# Patient Record
Sex: FEMALE | Race: ASIAN | NOT HISPANIC OR LATINO
[De-identification: names, ages, dates, MRNs, and addresses within clinical notes are randomized per-mention and may not be internally consistent; named-entity substitution may affect disease eponyms.]

---

## 2020-07-13 ENCOUNTER — TRANSCRIPTION ENCOUNTER (OUTPATIENT)
Age: 30
End: 2020-07-13

## 2021-08-05 PROBLEM — Z00.00 ENCOUNTER FOR PREVENTIVE HEALTH EXAMINATION: Status: ACTIVE | Noted: 2021-08-05

## 2021-08-06 ENCOUNTER — NON-APPOINTMENT (OUTPATIENT)
Age: 31
End: 2021-08-06

## 2021-08-11 ENCOUNTER — APPOINTMENT (OUTPATIENT)
Dept: ENDOCRINOLOGY | Facility: CLINIC | Age: 31
End: 2021-08-11
Payer: COMMERCIAL

## 2021-08-11 ENCOUNTER — NON-APPOINTMENT (OUTPATIENT)
Age: 31
End: 2021-08-11

## 2021-08-11 VITALS
TEMPERATURE: 97.2 F | HEART RATE: 109 BPM | SYSTOLIC BLOOD PRESSURE: 114 MMHG | BODY MASS INDEX: 17.42 KG/M2 | WEIGHT: 111 LBS | HEIGHT: 67 IN | DIASTOLIC BLOOD PRESSURE: 74 MMHG

## 2021-08-11 DIAGNOSIS — Z82.49 FAMILY HISTORY OF ISCHEMIC HEART DISEASE AND OTHER DISEASES OF THE CIRCULATORY SYSTEM: ICD-10-CM

## 2021-08-11 DIAGNOSIS — Z78.9 OTHER SPECIFIED HEALTH STATUS: ICD-10-CM

## 2021-08-11 DIAGNOSIS — I49.9 CARDIAC ARRHYTHMIA, UNSPECIFIED: ICD-10-CM

## 2021-08-11 DIAGNOSIS — R63.6 UNDERWEIGHT: ICD-10-CM

## 2021-08-11 DIAGNOSIS — K21.9 GASTRO-ESOPHAGEAL REFLUX DISEASE W/OUT ESOPHAGITIS: ICD-10-CM

## 2021-08-11 DIAGNOSIS — Z83.79 FAMILY HISTORY OF OTHER DISEASES OF THE DIGESTIVE SYSTEM: ICD-10-CM

## 2021-08-11 DIAGNOSIS — E05.90 THYROTOXICOSIS, UNSPECIFIED W/OUT THYROTOXIC CRISIS OR STORM: ICD-10-CM

## 2021-08-11 PROCEDURE — 99204 OFFICE O/P NEW MOD 45 MIN: CPT

## 2021-08-11 RX ORDER — NORETHINDRONE ACETATE AND ETHINYL ESTRADIOL, ETHINYL ESTRADIOL AND FERROUS FUMARATE 1MG-10(24)
KIT ORAL
Refills: 0 | Status: ACTIVE | COMMUNITY

## 2021-08-11 NOTE — HISTORY OF PRESENT ILLNESS
[FreeTextEntry1] : Patient is a 31 yo here for evaluation of thyroid\par \par Patient went to PCP three weeks ago for weight loss. States 6 lb weight loss in the last three months.  Has a history of arrhythmia about ten years ago.  Recently there was some bad hives as well.  There was cough but unsure whether it was new or whether it was acid reflux.  Has been on OCP for a long time due to have menses.  Patient has a hx of ulcers and was on an antacid.  Has not had endoscopy since.  During summer, appetite is a little low.  Denies any history of anorexia or restrictive eating.  ? Some recent anxiety.  Three weeks ago, she had strep throat. Was on Zpac for five days. \par Takes probiotics and fiber\par Diet: "okay" not too healthy; has fast food on occasions\par Does not drink soda, attempting to cut down on caffeine\par Grandmother: hypothyroid, takes synthroid\par No exposures to lithium/amiodarone/radiation exposures\par \par 8/5/21\par TSH 1.05\par T3 119\par FTI 3.2\par \par 7/23/21\par TSH 0.82\par Free T4 1.5\par Total thyroxine is 14.9

## 2021-08-11 NOTE — REVIEW OF SYSTEMS
[Recent Weight Loss (___ Lbs)] : recent weight loss: [unfilled] lbs [Palpitations] : palpitations [Fatigue] : no fatigue [Decreased Appetite] : appetite not decreased [Dysphagia] : no dysphagia [Neck Pain] : no neck pain [Nasal Congestion] : no nasal congestion [Chest Pain] : no chest pain [Slow Heart Rate] : heart rate is not slow [Fast Heart Rate] : heart rate is not fast [Shortness Of Breath] : no shortness of breath [Cough] : no cough [Nausea] : no nausea [Constipation] : no constipation [Vomiting] : no vomiting [Diarrhea] : no diarrhea

## 2021-08-11 NOTE — REASON FOR VISIT
[Initial Evaluation] : an initial evaluation [Hyperthyroidism] : hyperthyroidism [FreeTextEntry2] : Dr. Josep Andino

## 2021-08-11 NOTE — ASSESSMENT
[FreeTextEntry1] : Patient is a 29 yo woman here to be evaluated for hyperthyroidism \par \par 1. Hyperthyroid\par -patient has NORMAL TSH and free T4 levels on two occasions. Labs were reviewed and scanned in the chart. Most recent TSH August 8, 2021 was 1.050 with negative TPO antibodies, normal T3 levels. Free T4 was also normal. She had mild elevation in total t4 and that is likely from estrogen in her OCP \par -her symptoms can be consistent with hyperthyroidism including palpitations, weight loss and heat intolerance but no chemical hyperthyroidism noted on lab work\par -I will repeat thyroid hormone levels today to confirm; will obtain TRAB antibodies and TSI as well for completeness \par -will screen for diabetes with an A1c\par -patient is underweight and if thyroid labs are normal, she requires further work up to elucidate the cause of unexplained weight loss\par -she requires continual care with cardiology to go over results of Holter monitoring and elucidate the cause of her palpitations\par -due to underweight status, I will screen for celiac disease. Denies any nausea/vomiting, no hypoglycemia on exam, no hypotension, no evidence of adrenal insufficiency\par \par Follow up based on results above.

## 2021-08-11 NOTE — PHYSICAL EXAM
[Alert] : alert [Well Nourished] : well nourished [No Acute Distress] : no acute distress [EOMI] : extra ocular movement intact [Thyroid Not Enlarged] : the thyroid was not enlarged [No Respiratory Distress] : no respiratory distress [No Accessory Muscle Use] : no accessory muscle use [Clear to Auscultation] : lungs were clear to auscultation bilaterally [Normal S1, S2] : normal S1 and S2 [No Edema] : no peripheral edema [Normal Bowel Sounds] : normal bowel sounds [Not Tender] : non-tender [Soft] : abdomen soft [Normal Gait] : normal gait [No Joint Swelling] : no joint swelling seen [No Motor Deficits] : the motor exam was normal [No Tremors] : no tremors [Normal Affect] : the affect was normal [Normal Insight/Judgement] : insight and judgment were intact [Normal Mood] : the mood was normal [de-identified] :

## 2021-08-11 NOTE — CONSULT LETTER
[Dear  ___] : Dear  [unfilled], [Consult Letter:] : I had the pleasure of evaluating your patient, [unfilled]. [Please see my note below.] : Please see my note below. [Consult Closing:] : Thank you very much for allowing me to participate in the care of this patient.  If you have any questions, please do not hesitate to contact me. [Sincerely,] : Sincerely, [FreeTextEntry3] : Elizabeth Andino MD

## 2021-08-12 ENCOUNTER — TRANSCRIPTION ENCOUNTER (OUTPATIENT)
Age: 31
End: 2021-08-12

## 2021-08-12 LAB
ESTIMATED AVERAGE GLUCOSE: 100 MG/DL
HBA1C MFR BLD HPLC: 5.1 %
T3 SERPL-MCNC: 125 NG/DL
T4 FREE SERPL-MCNC: 1.6 NG/DL
THYROGLOB AB SERPL-ACNC: <20 IU/ML
THYROPEROXIDASE AB SERPL IA-ACNC: <10 IU/ML
TSH SERPL-ACNC: 1.21 UIU/ML

## 2021-08-13 ENCOUNTER — TRANSCRIPTION ENCOUNTER (OUTPATIENT)
Age: 31
End: 2021-08-13

## 2021-08-16 LAB
TSH RECEPTOR AB: <1.1 IU/L
TSI ACT/NOR SER: <0.1 IU/L
TTG IGA SER IA-ACNC: <1.2 U/ML
TTG IGA SER-ACNC: NEGATIVE
TTG IGG SER IA-ACNC: 13.4 U/ML
TTG IGG SER IA-ACNC: POSITIVE

## 2022-04-15 ENCOUNTER — TRANSCRIPTION ENCOUNTER (OUTPATIENT)
Age: 32
End: 2022-04-15

## 2022-06-27 ENCOUNTER — RESULT REVIEW (OUTPATIENT)
Age: 32
End: 2022-06-27

## 2023-08-01 ENCOUNTER — NON-APPOINTMENT (OUTPATIENT)
Age: 33
End: 2023-08-01

## 2025-06-06 ENCOUNTER — RESULT REVIEW (OUTPATIENT)
Age: 35
End: 2025-06-06